# Patient Record
Sex: FEMALE | Employment: FULL TIME | URBAN - NONMETROPOLITAN AREA
[De-identification: names, ages, dates, MRNs, and addresses within clinical notes are randomized per-mention and may not be internally consistent; named-entity substitution may affect disease eponyms.]

---

## 2025-01-05 ENCOUNTER — TELEPHONE (OUTPATIENT)
Dept: CARDIOLOGY | Facility: CLINIC | Age: 40
End: 2025-01-05

## 2025-01-05 DIAGNOSIS — J02.0 STREP THROAT: Primary | ICD-10-CM

## 2025-01-05 RX ORDER — AMOXICILLIN 500 MG/1
500 CAPSULE ORAL EVERY 12 HOURS SCHEDULED
Qty: 20 CAPSULE | Refills: 0 | Status: CANCELLED | OUTPATIENT
Start: 2025-01-05 | End: 2025-01-15

## 2025-02-07 DIAGNOSIS — J10.1 INFLUENZA A: Primary | ICD-10-CM

## 2025-02-07 RX ORDER — OSELTAMIVIR PHOSPHATE 75 MG/1
75 CAPSULE ORAL EVERY 24 HOURS
Qty: 10 CAPSULE | Refills: 0 | Status: SHIPPED | OUTPATIENT
Start: 2025-02-07 | End: 2025-02-17

## 2025-04-18 ENCOUNTER — HOSPITAL ENCOUNTER (OUTPATIENT)
Dept: RADIOLOGY | Facility: CLINIC | Age: 40
Discharge: HOME | End: 2025-04-18
Payer: COMMERCIAL

## 2025-04-18 ENCOUNTER — OFFICE VISIT (OUTPATIENT)
Dept: OBSTETRICS AND GYNECOLOGY | Facility: CLINIC | Age: 40
End: 2025-04-18
Payer: COMMERCIAL

## 2025-04-18 VITALS
DIASTOLIC BLOOD PRESSURE: 57 MMHG | HEART RATE: 77 BPM | BODY MASS INDEX: 31.79 KG/M2 | HEIGHT: 64 IN | SYSTOLIC BLOOD PRESSURE: 91 MMHG | WEIGHT: 186.2 LBS

## 2025-04-18 DIAGNOSIS — Z01.419 ENCOUNTER FOR ANNUAL ROUTINE GYNECOLOGICAL EXAMINATION: Primary | ICD-10-CM

## 2025-04-18 DIAGNOSIS — Z11.51 SCREENING FOR HUMAN PAPILLOMAVIRUS (HPV): ICD-10-CM

## 2025-04-18 DIAGNOSIS — N92.6 IRREGULAR MENSTRUAL BLEEDING: ICD-10-CM

## 2025-04-18 PROCEDURE — 99385 PREV VISIT NEW AGE 18-39: CPT

## 2025-04-18 PROCEDURE — 3008F BODY MASS INDEX DOCD: CPT

## 2025-04-18 PROCEDURE — 76856 US EXAM PELVIC COMPLETE: CPT

## 2025-04-18 PROCEDURE — 1036F TOBACCO NON-USER: CPT

## 2025-04-18 ASSESSMENT — ENCOUNTER SYMPTOMS
VOMITING: 0
HEADACHES: 0
OCCASIONAL FEELINGS OF UNSTEADINESS: 0
COLOR CHANGE: 0
DEPRESSION: 0
DYSURIA: 0
FEVER: 0
UNEXPECTED WEIGHT CHANGE: 0
NAUSEA: 0
COUGH: 0
CHILLS: 0
DIZZINESS: 0
SHORTNESS OF BREATH: 0
LOSS OF SENSATION IN FEET: 0
FATIGUE: 0
ABDOMINAL PAIN: 0

## 2025-04-18 ASSESSMENT — PAIN SCALES - GENERAL: PAINLEVEL_OUTOF10: 0-NO PAIN

## 2025-04-18 ASSESSMENT — LIFESTYLE VARIABLES
HOW MANY STANDARD DRINKS CONTAINING ALCOHOL DO YOU HAVE ON A TYPICAL DAY: PATIENT DOES NOT DRINK
HOW OFTEN DO YOU HAVE A DRINK CONTAINING ALCOHOL: NEVER
AUDIT-C TOTAL SCORE: 0
SKIP TO QUESTIONS 9-10: 1
HOW OFTEN DO YOU HAVE SIX OR MORE DRINKS ON ONE OCCASION: NEVER

## 2025-04-18 ASSESSMENT — PATIENT HEALTH QUESTIONNAIRE - PHQ9
2. FEELING DOWN, DEPRESSED OR HOPELESS: NOT AT ALL
SUM OF ALL RESPONSES TO PHQ9 QUESTIONS 1 & 2: 0
1. LITTLE INTEREST OR PLEASURE IN DOING THINGS: NOT AT ALL

## 2025-04-18 NOTE — PROGRESS NOTES
"Subjective   Bev Lei is a 39 y.o. female who is here for a routine GYN exam. Last saw Dr. Skinner 2019.    - She does state that her menstrual cycles have been somewhat irregular for the last year; throughout the last year, she is experienced spotting between her menstrual bleeds without any associated pelvic pain or cramping; states menstrual cycles from January until now have been slightly different as well, more frequent bleeding; she has recently had her menses 4/15 but with residual spotting now.  Denies any current pelvic pain today.  Denies any lifestyle changes such as stress, diet, exercise, weight changes, medication changes.    - Denies any breast or vaginal concerns today.    Complaints:   see hpi  Periods: Irregular  Dysmenorrhea:  none    Current contraception: Condoms  History of abnormal Pap smear: yes, [facility]   History of abnormal mammogram: no      OB History          2    Para   1    Term                AB        Living   2         SAB        IAB        Ectopic        Multiple        Live Births   2                  Review of Systems   Constitutional:  Negative for chills, fatigue, fever and unexpected weight change.   Respiratory:  Negative for cough and shortness of breath.    Gastrointestinal:  Negative for abdominal pain, nausea and vomiting.   Genitourinary:  Positive for menstrual problem. Negative for dyspareunia, dysuria, pelvic pain and vaginal discharge.   Skin:  Negative for color change and rash.   Neurological:  Negative for dizziness and headaches.       Objective   BP 91/57   Pulse 77   Ht 1.625 m (5' 3.98\")   Wt 84.5 kg (186 lb 3.2 oz)   LMP 04/15/2025 (Approximate) Comment: periods been on and off , not full period  BMI 31.98 kg/m²        General:   Alert and oriented, in no acute distress   Neck: Supple. No visible thyromegaly.    Breast/Axilla: Normal to palpation bilaterally without masses, skin changes, or nipple discharge.    Abdomen: Soft, " non-tender, without masses or organomegaly   Vulva: Normal architecture without erythema, masses, or lesions.    Vagina: Normal mucosa without lesions, masses, or atrophy. No abnormal vaginal discharge.  Scant amount of brown blood throughout the canal   Cervix: Normal without masses or lesions; uniform erythematous pattern surrounding her cervical os; Pap performed, scant bleeding with Pap   Uterus: Normal, mobile, non-enlarged uterus; nontender   Adnexa: Normal without masses or lesions; positive right adnexal tenderness   Pelvic Floor Normal    Psych Normal affect. Normal mood.      Assessment/Plan   Diagnoses and all orders for this visit:  Encounter for annual routine gynecological examination  -     THINPREP PAP TEST  Screening for human papillomavirus (HPV)  -     THINPREP PAP TEST  Irregular menstrual bleeding  -     US PELVIS TRANSABDOMINAL WITH TRANSVAGINAL; Future  -     FSH & LH; Future  -     Testosterone,Free and Total; Future  -     TSH with reflex to Free T4 if abnormal; Future  -     CBC; Future  - Will assess for any structural or hormonal abnormalities that may be contributing.  I encouraged her to continue tracking her menstrual cycles in the meantime to gauge a better idea of the irregular patterns that are occurring.     Sharon Munoz PA-C

## 2025-04-19 LAB
ERYTHROCYTE [DISTWIDTH] IN BLOOD BY AUTOMATED COUNT: 12.5 % (ref 11–15)
FSH SERPL-ACNC: 5.1 MIU/ML
HCT VFR BLD AUTO: 44.8 % (ref 35–45)
HGB BLD-MCNC: 15.2 G/DL (ref 11.7–15.5)
LH SERPL-ACNC: 2.6 MIU/ML
MCH RBC QN AUTO: 30.9 PG (ref 27–33)
MCHC RBC AUTO-ENTMCNC: 33.9 G/DL (ref 32–36)
MCV RBC AUTO: 91.1 FL (ref 80–100)
PLATELET # BLD AUTO: 264 THOUSAND/UL (ref 140–400)
PMV BLD REES-ECKER: 9.5 FL (ref 7.5–12.5)
RBC # BLD AUTO: 4.92 MILLION/UL (ref 3.8–5.1)
TESTOST FREE SERPL-MCNC: NORMAL PG/ML
TESTOST SERPL-MCNC: NORMAL NG/DL
TSH SERPL-ACNC: 0.86 MIU/L
WBC # BLD AUTO: 6.6 THOUSAND/UL (ref 3.8–10.8)

## 2025-04-22 LAB
ERYTHROCYTE [DISTWIDTH] IN BLOOD BY AUTOMATED COUNT: 12.5 % (ref 11–15)
FSH SERPL-ACNC: 5.1 MIU/ML
HCT VFR BLD AUTO: 44.8 % (ref 35–45)
HGB BLD-MCNC: 15.2 G/DL (ref 11.7–15.5)
LH SERPL-ACNC: 2.6 MIU/ML
MCH RBC QN AUTO: 30.9 PG (ref 27–33)
MCHC RBC AUTO-ENTMCNC: 33.9 G/DL (ref 32–36)
MCV RBC AUTO: 91.1 FL (ref 80–100)
PLATELET # BLD AUTO: 264 THOUSAND/UL (ref 140–400)
PMV BLD REES-ECKER: 9.5 FL (ref 7.5–12.5)
RBC # BLD AUTO: 4.92 MILLION/UL (ref 3.8–5.1)
TESTOST FREE SERPL-MCNC: 1.4 PG/ML (ref 0.1–6.4)
TESTOST SERPL-MCNC: 24 NG/DL (ref 2–45)
TSH SERPL-ACNC: 0.86 MIU/L
WBC # BLD AUTO: 6.6 THOUSAND/UL (ref 3.8–10.8)

## 2025-05-01 LAB
CYTOLOGY CMNT CVX/VAG CYTO-IMP: NORMAL
HPV HR 12 DNA GENITAL QL NAA+PROBE: NEGATIVE
HPV HR GENOTYPES PNL CVX NAA+PROBE: NEGATIVE
HPV16 DNA SPEC QL NAA+PROBE: NEGATIVE
HPV18 DNA SPEC QL NAA+PROBE: NEGATIVE
LAB AP HPV GENOTYPE QUESTION: YES
LAB AP HPV HR: NORMAL
LAB AP TREATMENT HISTORY: NORMAL
LABORATORY COMMENT REPORT: NORMAL
LMP START DATE: NORMAL
PATH REPORT.TOTAL CANCER: NORMAL